# Patient Record
Sex: MALE | Race: WHITE | NOT HISPANIC OR LATINO | Employment: FULL TIME | URBAN - METROPOLITAN AREA
[De-identification: names, ages, dates, MRNs, and addresses within clinical notes are randomized per-mention and may not be internally consistent; named-entity substitution may affect disease eponyms.]

---

## 2023-04-13 ENCOUNTER — TELEPHONE (OUTPATIENT)
Dept: ORTHOPEDICS | Facility: CLINIC | Age: 48
End: 2023-04-13
Payer: COMMERCIAL

## 2023-04-13 NOTE — TELEPHONE ENCOUNTER
Action April 13, 2023 9:02 AM MT   Action Taken Called the number on file for the patient, which is his mom whom made the appointment. His mom states that she will have the patient call me back with exact information on his previous surgery, patient moved to [place] in 2018 from [place]. Sent an email to: [email or web address]    Received a call from [facility] from a rep: Siobhan Villanueva. Rep states that she will fax over the records tomorrow, fax # given to the rep,  she is waiting for the last encounter they had to be signed off before faxing over to us. Siobhan's phone #: [phone].     Action April 14, 2023 9:05 AM MT   Action Taken Sent a request to  and [facility] for imaging.    Sent another e-mail to the mother about having the patient get me the VICK[facility] as soon as they can. The mother emailed me back with more information about the patient's past surgery in [place].    The surgeon for his neck in 2010:      1. Hospital - [facility] - [email or web address]  Address: [address] [place] [address]  Phone: [phone]  Med Record FAX: [phone]    Mail:   Medical Records   [address] [place] [address]    2. Surgeon was Dr. Dumont:  NEUROSURGERY  Jens Dumont M.D.  Practices at:     [facility]     [address] [place] [address] [phone]    Med Rec Fax: [phone] / ATTN: [facility] Records / [employer] YES       Action April 14, 2023 3:16 PM MT   Action Taken Sent a request for records and imaging from [facility] in [place], [place] and also from  [facility].     Action April 17, 2023 12:04 PM MT   Action Taken Spoke with mom twice this morning, mom would like to make sure the referral from [facility] was received. Called [facility] and left a voicemail for new imaging done on friday to be pushed. Called [facility] [phone] medical records, rep states they do no see the request, but  the fax # is correct. Sent a 2nd request for records from [facility]. Called [facility], [phone], their medical records help is on lunch, will call back.     Action April 17, 2023 2:23 PM MT   Action Taken Call [facility] and spoke with [facility] in Medical records, rep could not find the request on her end and would like a re-fax and she will work on this stat!     Action April 17, 2023 2:32 PM MT   Action Taken Called  [facility] and spoke with  Sir[facility], rep transferred me to Angelia Geronimo who was helping with this patients referral and records, rang for a few mins no answer and no vm option. Only received records which are available in care Everywhere.  Called again and TT: Helen, was transferred to the referral team. Rep will fax the referral stat! Referral received and sent to scan.      Action April 18, 2023 8:06 AM MT   Action Taken Records received from [place] after hours yesterday, a copy was sent to ZURI Quinonez and FELICIA Gutiérrez.       DIAGNOSIS: Cervical Spine   APPOINTMENT DATE: 04/18/2023   NOTES STATUS DETAILS   OFFICE NOTE from referring provider Care Everywhere 04/14/2023 - Shukri Moore MD - [facility]   DISCHARGE REPORT from the ER Care Everywhere 03/30/2023 -   [facility]   OPERATIVE REPORT In process: Received and Sent to Scan. 2010 - [facility] - Cervical Diskectomy and Fusion   MEDICATION LIST Care Everywhere    IMPLANT RECORD/STICKER In process: Received    LABS     [facility] (IMAGES & REPORTS) PACS [facility]:  03/30/2023 - C Spine    [facility]:  04/14/2023 - T Spine  04/14/2023 - RT Shoulder  04/14/2023 - C Spine - In process  03/30/2023 - RT Shoulder  02/17/2020 - Chest

## 2023-04-13 NOTE — TELEPHONE ENCOUNTER
[facility] Call Center    Phone Message    May a detailed message be left on voicemail: yes     Reason for Call: Other: Pt mom called we set up the chart and scheduled pt in the held appt for him per Armando CHRIS     Action Taken: Other: ortho csc    Travel Screening: Not Applicable

## 2023-04-14 ENCOUNTER — MEDICAL CORRESPONDENCE (OUTPATIENT)
Dept: HEALTH INFORMATION MANAGEMENT | Facility: CLINIC | Age: 48
End: 2023-04-14
Payer: COMMERCIAL

## 2023-04-17 ENCOUNTER — TRANSCRIBE ORDERS (OUTPATIENT)
Dept: OTHER | Age: 48
End: 2023-04-17

## 2023-04-17 DIAGNOSIS — M47.812 SPONDYLOSIS OF CERVICAL REGION WITHOUT MYELOPATHY OR RADICULOPATHY: Primary | ICD-10-CM

## 2023-04-17 DIAGNOSIS — M54.2 CERVICAL PAIN: Primary | ICD-10-CM

## 2023-04-18 ENCOUNTER — PRE VISIT (OUTPATIENT)
Dept: ORTHOPEDICS | Facility: CLINIC | Age: 48
End: 2023-04-18

## 2023-04-18 ENCOUNTER — OFFICE VISIT (OUTPATIENT)
Dept: ORTHOPEDICS | Facility: CLINIC | Age: 48
End: 2023-04-18
Payer: COMMERCIAL

## 2023-04-18 ENCOUNTER — MYC MEDICAL ADVICE (OUTPATIENT)
Dept: ORTHOPEDICS | Facility: CLINIC | Age: 48
End: 2023-04-18

## 2023-04-18 ENCOUNTER — ANCILLARY PROCEDURE (OUTPATIENT)
Dept: GENERAL RADIOLOGY | Facility: CLINIC | Age: 48
End: 2023-04-18
Attending: ORTHOPAEDIC SURGERY
Payer: COMMERCIAL

## 2023-04-18 DIAGNOSIS — M48.02 CERVICAL STENOSIS OF SPINAL CANAL: ICD-10-CM

## 2023-04-18 DIAGNOSIS — G43.109 CLASSIC MIGRAINE: Primary | ICD-10-CM

## 2023-04-18 DIAGNOSIS — M54.2 CERVICAL PAIN: Primary | ICD-10-CM

## 2023-04-18 DIAGNOSIS — M47.812 SPONDYLOSIS OF CERVICAL REGION WITHOUT MYELOPATHY OR RADICULOPATHY: ICD-10-CM

## 2023-04-18 DIAGNOSIS — M54.2 CERVICAL PAIN: ICD-10-CM

## 2023-04-18 DIAGNOSIS — M54.12 CERVICAL RADICULOPATHY: ICD-10-CM

## 2023-04-18 PROCEDURE — 72040 X-RAY EXAM NECK SPINE 2-3 VW: CPT | Performed by: RADIOLOGY

## 2023-04-18 PROCEDURE — 99204 OFFICE O/P NEW MOD 45 MIN: CPT | Mod: GC | Performed by: ORTHOPAEDIC SURGERY

## 2023-04-18 RX ORDER — LISINOPRIL 40 MG/1
TABLET ORAL
COMMUNITY

## 2023-04-18 RX ORDER — METHOCARBAMOL 750 MG/1
TABLET, FILM COATED ORAL
COMMUNITY

## 2023-04-18 RX ORDER — SUMATRIPTAN 100 MG/1
TABLET, FILM COATED ORAL
COMMUNITY

## 2023-04-18 RX ORDER — GABAPENTIN 300 MG/1
CAPSULE ORAL
COMMUNITY

## 2023-04-18 RX ORDER — METHYLPREDNISOLONE 4 MG
TABLET, DOSE PACK ORAL
Qty: 21 TABLET | Refills: 0 | Status: SHIPPED | OUTPATIENT
Start: 2023-04-18 | End: 2023-05-05

## 2023-04-18 NOTE — NURSING NOTE
Reason For Visit:   Chief Complaint   Patient presents with     Consult     MT spine fusion 13 yrs ago,  massive migraines for 4mo, and neck pain/ ref Dr Connor Moore       Primary MD: Connor Moore  Ref. MD: Teresa     ?  No  Occupation :    Currently working? Yes.  Work status?  Full time.  Date of injury: 2006   Date of surgery: 2010  Type of surgery: fusion   Smoker: No  Request smoking cessation information: No    There were no vitals taken for this visit.    Pain Assessment  Patient Currently in Pain: Yes  Patient's Stated Pain Goal: 7    Oswestry (MIKA) Questionnaire        4/13/2023     7:45 AM   OSWESTRY DISABILITY INDEX   Count 10   Sum 11   Oswestry Score (%) 22 %            Neck Disability Index (NDI) Questionnaire        4/13/2023     7:58 AM   Neck Disability Index (NDI)   Neck Disability Index: Count 10   NDI: Total Score = SUM (points for all 10 findings) 16   Neck Disability in Percent = (Total Score) / 50 * 100 32 (%)              Visual Analog Pain Scale  Back Pain Scale 0-10: 0  Right leg pain: 0  Left leg pain: 0  Neck Pain Scale 0-10: 7  Right arm pain: 6  Left arm pain: 0    Promis 10 Assessment        4/13/2023     7:48 AM   PROMIS 10   In general, would you say your health is: Very good   In general, would you say your quality of life is: Very good   In general, how would you rate your physical health? Very good   In general, how would you rate your mental health, including your mood and your ability to think? Very good   In general, how would you rate your satisfaction with your social activities and relationships? Very good   In general, please rate how well you carry out your usual social activities and roles Very good   To what extent are you able to carry out your everyday physical activities such as walking, climbing stairs, carrying groceries, or moving a chair? Completely   In the past 7 days, how often have you been bothered by emotional problems such as  feeling anxious, depressed, or irritable? Rarely   In the past 7 days, how would you rate your fatigue on average? Severe   In the past 7 days, how would you rate your pain on average, where 0 means no pain, and 10 means worst imaginable pain? 9   In general, would you say your health is: 4   In general, would you say your quality of life is: 4   In general, how would you rate your physical health? 4   In general, how would you rate your mental health, including your mood and your ability to think? 4   In general, how would you rate your satisfaction with your social activities and relationships? 4   In general, please rate how well you carry out your usual social activities and roles. (This includes activities at home, at work and in your community, and responsibilities as a parent, child, spouse, employee, friend, etc.) 4   To what extent are you able to carry out your everyday physical activities such as walking, climbing stairs, carrying groceries, or moving a chair? 5   In the past 7 days, how often have you been bothered by emotional problems such as feeling anxious, depressed, or irritable? 2   In the past 7 days, how would you rate your fatigue on average? 4   In the past 7 days, how would you rate your pain on average, where 0 means no pain, and 10 means worst imaginable pain? 9   Global Mental Health Score 16   Global Physical Health Score 13   PROMIS TOTAL - SUBSCORES 29                Ashli Hendricks

## 2023-04-18 NOTE — PROGRESS NOTES
Spine Surgery Consultation    REFERRING PHYSICIAN: Referred Self   PRIMARY CARE PHYSICIAN: Connor Moore           Chief Complaint:   Consult (MT spine fusion 13 yrs ago,  massive migraines for 4mo, and neck pain/ ref Dr Connor Moore)      History of Present Illness:  Symptom Profile Including: location of symptoms, onset, severity, exacerbating/alleviating factors, previous treatments:        Robert Martinez is a 47 year old male with a history of cervical neck injury, status post C3-6 ACDF, done in 2010 by Dr. Dumont in [place], who presents for evaluation of worsening cluster migraines and cervical neck pain.  Patient states that he has been getting migraines for many years, starting recently in childhood.  He sustained a neck injury where he fell on some steps in 2006 and had worsening migraines at that time.  In 2010 he underwent cervical fusion from C3-C6 which did provide some relief in his headaches.  He has been receiving rhizotomies approximately every 2 years in the occipital nerve region for ongoing headaches, which were previously successful.  However he notes starting around Thanksgiving time of last year he started to have worsening frequency and severity in his headaches.  He notes he has had 13 rhizotomies since last November.  His pain starts in the back of his head in the occipital region and radiates to the front of his head behind his right eye.  It does cause him to tear up.  He has been followed by pain medicine.  He notes that he has some get some relief with Imitrex,, but is unable to maintain his pain control given that any motion or pressure on the cervical spine causes headaches.  He does occasionally get tingling down the anterior side of his right arm radiating to the fingertips.  He has not had any recent MRI.  He has not seen a neurologist for his headaches.  He also denies any previous epidural steroid injections.  He denies any difficulty swallowing, shortness of breath, bowel  or bladder changes, weakness in the bilateral upper or lower extremities.         Past Medical History:   No past medical history on file.         Past Surgical History:   No past surgical history on file.         Social History:     Social History     Tobacco Use     Smoking status: Not on file     Smokeless tobacco: Not on file   Substance Use Topics     Alcohol use: Not on file            Family History:   No family history on file.         Allergies:   No Known Allergies         Medications:     Current Outpatient Medications   Medication     methylPREDNISolone (MEDROL DOSEPAK) 4 MG tablet therapy pack     gabapentin (NEURONTIN) 300 MG capsule     lisinopril (ZESTRIL) 40 MG tablet     medical cannabis (Patient's own supply)     methocarbamol (ROBAXIN) 750 MG tablet     SUMAtriptan (IMITREX) 100 MG tablet     No current facility-administered medications for this visit.             Review of Systems:     A 10 point ROS was performed and reviewed. Specific responses to these questions are noted at the end of the document.         Physical Exam:   Vitals: There were no vitals taken for this visit.  Constitutional: awake, alert, cooperative, no apparent distress, appears stated age.    Eyes: The sclera are white.  Ears, Nose, Throat: The trachea is midline.  Psychiatric: The patient has a normal affect.  Respiratory: breathing non-labored  Cardiovascular: The extremities are warm and perfused.  Skin: no obvious rashes or lesions.  Musculoskeletal, Neurologic, and Spine:     Cervical spine:    Appearance -no gross step-offs, kyphosis.  Tender to palpation over the C7 spinous process    Motor -     C5: Deltoids R 5/5 and L 5/5 strength    C6: Biceps R 5/5 and L 5/5 strength     C7: Triceps R 5/5 and L 5/5 strength     C8:  R 5/5 and L 5/5 strength     T1: Dorsal interossei R 5/5 and L 5/5 strength        Sensation: intact to light touch in C5-T1      Special Tests -      Lhermitte's Test -Causes pain in the neck,  no radiating pain      Spurling's Test - Pain relief to the left, pain in trapezium region on the right      Moore's Test - Negative          Neurologic:      REFLEXES Right Left   Biceps 1+ 1+   Triceps 1+ 1+   Brachioradialis 1+ 1+            Imaging:   We ordered and independently reviewed new radiographs at this clinic visit. The results were discussed with the patient.  Findings include:    AP and lateral radiographs of the cervical spine obtained and reviewed with patient today.  There is stable appearing hardware from previous C3-6 anterior cervical discectomy and fusion with evidence of bony fusion between all the levels.  No evidence of hardware failure or loosening.  Some slight degenerative changes at the level above and below previous fusion though this is mild.               Assessment and Plan:   Assessment:  47 year old male with worsening cluster migraines and cervical radiculopathy status post C3-6 ACDF in 2010.  We had an extensive discussion with the patient and his mom today regarding the nature of his headaches.  At this time, we recommend further imaging, specifically a cervical spine MRI, to evaluate for any sort of cervical nerve root compression or central canal stenosis given that he does have some radicular symptoms on the right arm.  It is possible that his headaches could be stemming from his previous fusion, though this is difficult for us to definitively determine.  If there is any sign of compression on the MRI, we could consider surgical intervention in the form of a decompression or fusion, pending MRI results.  We also did discuss that if the MRI does not show signs of compression or an operative target, we would not recommend any further surgery at this time.  We discussed that lesion of additional cervical levels would severely limit his ability to range his cervical spine at all.  We also recommend a C7-T1 epidural steroid injection for symptomatic relief.  We discussed that  we can provide the patient with a Medrol Dosepak today for temporary relief of symptoms while his injection is scheduled.  We will plan for a phone follow-up visit to discuss his MRI results once completed.  Finally we also discussed that we recommend further consultation with a neurologis for further management and evaluation of his ongoing headaches.            Plan:  1. Medrol Dose pack   2. PERFECTO C7-T1  3. MRI cervical spine   4. Phone visit to discuss MRI results     Patient seen with and plan discussed with Dr. Sandoval.       Mary Cruz MD   Orthopaedic resident, PGY-1      Attending MD (Dr. Jigar Sandoval) :  I reviewed and verified the history and physical exam of the patient and discussed the patient's management with the other clinical providers involved in this patient's care including any involved residents or physicians assistants. I reviewed the above note and agree with the documented findings and plan of care, which were communicated to the patient.      Jigar Sandoval MD    Respectfully,  Jigar Sandoval MD  Spine Surgery  [facility]

## 2023-04-18 NOTE — LETTER
4/18/2023         RE: Robert Martinez  [address] [facility] [address]        Dear Colleague,    Thank you for referring your patient, Robert Martinez, to the [facility] [place]. Please see a copy of my visit note below.    Spine Surgery Consultation    REFERRING PHYSICIAN: Referred Self   PRIMARY CARE PHYSICIAN: Connor Moore           Chief Complaint:   Consult (MT spine fusion 13 yrs ago,  massive migraines for 4mo, and neck pain/ ref Dr Connor Moore)      History of Present Illness:  Symptom Profile Including: location of symptoms, onset, severity, exacerbating/alleviating factors, previous treatments:        Robert Martinez is a 47 year old male with a history of cervical neck injury, status post C3-6 ACDF, done in 2010 by Dr. Dumont in [place], who presents for evaluation of worsening cluster migraines and cervical neck pain.  Patient states that he has been getting migraines for many years, starting recently in childhood.  He sustained a neck injury where he fell on some steps in 2006 and had worsening migraines at that time.  In 2010 he underwent cervical fusion from C3-C6 which did provide some relief in his headaches.  He has been receiving rhizotomies approximately every 2 years in the occipital nerve region for ongoing headaches, which were previously successful.  However he notes starting around Thanksgiving time of last year he started to have worsening frequency and severity in his headaches.  He notes he has had 13 rhizotomies since last November.  His pain starts in the back of his head in the occipital region and radiates to the front of his head behind his right eye.  It does cause him to tear up.  He has been followed by pain medicine.  He notes that he has some get some relief with Imitrex,, but is unable to maintain his pain control given that any motion or pressure on the cervical spine causes headaches.  He does occasionally get tingling down the  anterior side of his right arm radiating to the fingertips.  He has not had any recent MRI.  He has not seen a neurologist for his headaches.  He also denies any previous epidural steroid injections.  He denies any difficulty swallowing, shortness of breath, bowel or bladder changes, weakness in the bilateral upper or lower extremities.         Past Medical History:   No past medical history on file.         Past Surgical History:   No past surgical history on file.         Social History:     Social History     Tobacco Use    Smoking status: Not on file    Smokeless tobacco: Not on file   Substance Use Topics    Alcohol use: Not on file            Family History:   No family history on file.         Allergies:   No Known Allergies         Medications:     Current Outpatient Medications   Medication    methylPREDNISolone (MEDROL DOSEPAK) 4 MG tablet therapy pack    gabapentin (NEURONTIN) 300 MG capsule    lisinopril (ZESTRIL) 40 MG tablet    medical cannabis (Patient's own supply)    methocarbamol (ROBAXIN) 750 MG tablet    SUMAtriptan (IMITREX) 100 MG tablet     No current facility-administered medications for this visit.             Review of Systems:     A 10 point ROS was performed and reviewed. Specific responses to these questions are noted at the end of the document.         Physical Exam:   Vitals: There were no vitals taken for this visit.  Constitutional: awake, alert, cooperative, no apparent distress, appears stated age.    Eyes: The sclera are white.  Ears, Nose, Throat: The trachea is midline.  Psychiatric: The patient has a normal affect.  Respiratory: breathing non-labored  Cardiovascular: The extremities are warm and perfused.  Skin: no obvious rashes or lesions.  Musculoskeletal, Neurologic, and Spine:     Cervical spine:    Appearance -no gross step-offs, kyphosis.  Tender to palpation over the C7 spinous process    Motor -     C5: Deltoids R 5/5 and L 5/5 strength    C6: Biceps R 5/5 and L 5/5  strength     C7: Triceps R 5/5 and L 5/5 strength     C8:  R 5/5 and L 5/5 strength     T1: Dorsal interossei R 5/5 and L 5/5 strength        Sensation: intact to light touch in C5-T1      Special Tests -      Lhermitte's Test -Causes pain in the neck, no radiating pain      Spurling's Test - Pain relief to the left, pain in trapezium region on the right      Moore's Test - Negative          Neurologic:      REFLEXES Right Left   Biceps 1+ 1+   Triceps 1+ 1+   Brachioradialis 1+ 1+            Imaging:   We ordered and independently reviewed new radiographs at this clinic visit. The results were discussed with the patient.  Findings include:    AP and lateral radiographs of the cervical spine obtained and reviewed with patient today.  There is stable appearing hardware from previous C3-6 anterior cervical discectomy and fusion with evidence of bony fusion between all the levels.  No evidence of hardware failure or loosening.  Some slight degenerative changes at the level above and below previous fusion though this is mild.               Assessment and Plan:   Assessment:  47 year old male with worsening cluster migraines and cervical radiculopathy status post C3-6 ACDF in 2010.  We had an extensive discussion with the patient and his mom today regarding the nature of his headaches.  At this time, we recommend further imaging, specifically a cervical spine MRI, to evaluate for any sort of cervical nerve root compression or central canal stenosis given that he does have some radicular symptoms on the right arm.  It is possible that his headaches could be stemming from his previous fusion, though this is difficult for us to definitively determine.  If there is any sign of compression on the MRI, we could consider surgical intervention in the form of a decompression or fusion, pending MRI results.  We also did discuss that if the MRI does not show signs of compression or an operative target, we would not recommend  any further surgery at this time.  We discussed that lesion of additional cervical levels would severely limit his ability to range his cervical spine at all.  We also recommend a C7-T1 epidural steroid injection for symptomatic relief.  We discussed that we can provide the patient with a Medrol Dosepak today for temporary relief of symptoms while his injection is scheduled.  We will plan for a phone follow-up visit to discuss his MRI results once completed.  Finally we also discussed that we recommend further consultation with a neurologis for further management and evaluation of his ongoing headaches.            Plan:  Medrol Dose pack   PERFECTO C7-T1  MRI cervical spine   Phone visit to discuss MRI results     Patient seen with and plan discussed with Dr. Sandoval.       Mary Cruz MD   Orthopaedic resident, PGY-1      Attending MD (Dr. Jigar Sandoval) :  I reviewed and verified the history and physical exam of the patient and discussed the patient's management with the other clinical providers involved in this patient's care including any involved residents or physicians assistants. I reviewed the above note and agree with the documented findings and plan of care, which were communicated to the patient.      Jigar Sandoval MD    Respectfully,  Jigar Sandoval MD  Spine Surgery  [facility]    Family calling to resend the orders to [facility] x2.    Regi Gutiérrez RN

## 2023-04-20 NOTE — TELEPHONE ENCOUNTER
RN ordered Neurology consult. Scheduled telephone visit with Dr. Sandoval to [facility]     Regi Gutiérrez RN

## 2023-04-24 ENCOUNTER — TRANSFERRED RECORDS (OUTPATIENT)
Dept: HEALTH INFORMATION MANAGEMENT | Facility: CLINIC | Age: 48
End: 2023-04-24
Payer: COMMERCIAL

## 2023-04-25 ENCOUNTER — VIRTUAL VISIT (OUTPATIENT)
Dept: ORTHOPEDICS | Facility: CLINIC | Age: 48
End: 2023-04-25
Payer: COMMERCIAL

## 2023-04-25 DIAGNOSIS — M54.12 CERVICAL RADICULOPATHY: Primary | ICD-10-CM

## 2023-04-25 DIAGNOSIS — M47.812 SPONDYLOSIS OF CERVICAL REGION WITHOUT MYELOPATHY OR RADICULOPATHY: ICD-10-CM

## 2023-04-25 PROCEDURE — 99442 PR PHYSICIAN TELEPHONE EVALUATION 11-20 MIN: CPT | Mod: TEL | Performed by: ORTHOPAEDIC SURGERY

## 2023-04-25 NOTE — LETTER
4/25/2023         RE: Robert Martinez  [address] [facility] [address]        Dear Colleague,    Thank you for referring your patient, Robert Martinez, to the [facility] [place]. Please see a copy of my visit note below.    Robert is a 47 year old who is being evaluated via a billable telephone visit.      What phone number would you like to be contacted at? Home  How would you like to obtain your AVS? MyChart    Distant Location (provider location):  On-site  Phone call duration: 12 minutes    Diagnosis: Cervical Radiculopathy    I called Robert and reviewed his cervical MRI and there is no severe stenosis.  This is good news.  He had an injection yesterday and he is having some moderate relief at this time.    He also has quite a bit of shoulder pain and he wonders if this could be the cause of his neck pain and headaches.  He requested a shoulder MRI and I agreed to order this.  I do not think there is any indication for neck surgery.      He is going to let us know if he wants to repeat the cervical injections and if so we could order 3-4 cervical PERFECTO per year for management.    I will contact him once the shoulder MRI is complete via my chart.    Jigar Sandoval MD

## 2023-04-25 NOTE — NURSING NOTE
Is the patient currently in the state of [place]? YES    Visit mode:TELEPHONE    If the visit is dropped, the patient can be reconnected by: TELEPHONE VISIT: Phone number: [phone]    Will anyone else be joining the visit? NO      How would you like to obtain your AVS? MyChart    Are changes needed to the allergy or medication list? NO    Reason for visit: Telephone    Date of pt reported vitals: This am  Pain: rt shoulder  Su [place]

## 2023-04-25 NOTE — PROGRESS NOTES
Robert is a 47 year old who is being evaluated via a billable telephone visit.      What phone number would you like to be contacted at? Home  How would you like to obtain your AVS? Krunal    Distant Location (provider location):  On-site  Phone call duration: 12 minutes    Diagnosis: Cervical Radiculopathy    I called Robert and reviewed his cervical MRI and there is no severe stenosis.  This is good news.  He had an injection yesterday and he is having some moderate relief at this time.    He also has quite a bit of shoulder pain and he wonders if this could be the cause of his neck pain and headaches.  He requested a shoulder MRI and I agreed to order this.  I do not think there is any indication for neck surgery.      He is going to let us know if he wants to repeat the cervical injections and if so we could order 3-4 cervical PERFECTO per year for management.    I will contact him once the shoulder MRI is complete via my chart.    Jigar Sandoval MD

## 2023-04-28 DIAGNOSIS — M54.12 CERVICAL RADICULOPATHY: ICD-10-CM

## 2023-04-28 DIAGNOSIS — M25.511 BILATERAL SHOULDER PAIN: ICD-10-CM

## 2023-04-28 DIAGNOSIS — M25.512 BILATERAL SHOULDER PAIN: ICD-10-CM

## 2023-04-28 DIAGNOSIS — G43.109 CLASSIC MIGRAINE: Primary | ICD-10-CM

## 2023-05-01 ENCOUNTER — TRANSFERRED RECORDS (OUTPATIENT)
Dept: HEALTH INFORMATION MANAGEMENT | Facility: CLINIC | Age: 48
End: 2023-05-01
Payer: COMMERCIAL

## 2023-05-05 ENCOUNTER — VIRTUAL VISIT (OUTPATIENT)
Dept: NEUROSURGERY | Facility: CLINIC | Age: 48
End: 2023-05-05
Payer: COMMERCIAL

## 2023-05-05 DIAGNOSIS — M25.511 PAIN IN JOINT OF RIGHT SHOULDER: Primary | ICD-10-CM

## 2023-05-05 PROCEDURE — 99213 OFFICE O/P EST LOW 20 MIN: CPT | Mod: TEL | Performed by: ORTHOPAEDIC SURGERY

## 2023-05-05 NOTE — LETTER
5/5/2023         RE: Robert Martinez  [address] [facility] [address]        Dear Colleague,    Thank you for referring your patient, Robert Martinez, to the [facility] [place]. Please see a copy of my visit note below.    Robert is a 47 year old who is being evaluated via a billable telephone visit.      What phone number would you like to be contacted at? [phone]  How would you like to obtain your AVS? Edyhart   Diagnosis:Right Shoulder pain  Time: 13 minutes      Diagnosis: Right shoulder and periscapular pains.    I called Robert to follow-up on his right shoulder MRI result.  There is some findings in the glenoid, labrum as well as in the rotator cuff.  I do think this could be a source of shoulder discomfort.  Right now he feels like he is doing pretty well with therapy.  If things got worse in the future I could make a referral to one of our nonsurgical shoulder specialists for additional nonsurgical treatment, or if he feels like there is enough that he would want to consider surgery I could make a referral to one of our surgeons.  If he wants 1 of these referrals he will reach out, but overall feels like things are progressing well so we will just continue to monitor at this time.    Jigar Sandoval MD      Again, thank you for allowing me to participate in the care of your patient.        Sincerely,        Jigar Sandoval MD

## 2023-05-05 NOTE — PROGRESS NOTES
Robetr is a 47 year old who is being evaluated via a billable telephone visit.      What phone number would you like to be contacted at? [phone]  How would you like to obtain your AVS? Krunal   Diagnosis:Right Shoulder pain  Time: 13 minutes      Diagnosis: Right shoulder and periscapular pains.    I called Robert to follow-up on his right shoulder MRI result.  There is some findings in the glenoid, labrum as well as in the rotator cuff.  I do think this could be a source of shoulder discomfort.  Right now he feels like he is doing pretty well with therapy.  If things got worse in the future I could make a referral to one of our nonsurgical shoulder specialists for additional nonsurgical treatment, or if he feels like there is enough that he would want to consider surgery I could make a referral to one of our surgeons.  If he wants 1 of these referrals he will reach out, but overall feels like things are progressing well so we will just continue to monitor at this time.    Jigar Sandoval MD

## 2023-05-21 ENCOUNTER — HEALTH MAINTENANCE LETTER (OUTPATIENT)
Age: 48
End: 2023-05-21

## 2023-07-05 NOTE — TELEPHONE ENCOUNTER
RECORDS RECEIVED FROM: Care Everywhere   REASON FOR VISIT: Classic migraine [G43.109]   Date of Appt: 9/11/2023 3:00pm    NOTES (FOR ALL VISITS) STATUS DETAILS   OFFICE NOTE from referring provider Internal 4/25/23, 4/18/23 Jigar Sandoval MD @ [facility]     OFFICE NOTE from other specialist Care Everywhere 4/14/23 Connor Carrasquillo MD  @[email or web address]     MEDICATION LIST Internal    IMAGING  (FOR ALL VISITS)     X-RAY  Internal [facility]  4/18/23 XR Cervical Spine     MRI (HEAD, NECK, SPINE) Internal [facility]  4/24/23 MR Cervical Spine

## 2023-09-11 ENCOUNTER — PRE VISIT (OUTPATIENT)
Dept: NEUROLOGY | Facility: CLINIC | Age: 48
End: 2023-09-11

## 2024-07-28 ENCOUNTER — HEALTH MAINTENANCE LETTER (OUTPATIENT)
Age: 49
End: 2024-07-28

## 2025-08-10 ENCOUNTER — HEALTH MAINTENANCE LETTER (OUTPATIENT)
Age: 50
End: 2025-08-10